# Patient Record
Sex: MALE | Race: BLACK OR AFRICAN AMERICAN | URBAN - METROPOLITAN AREA
[De-identification: names, ages, dates, MRNs, and addresses within clinical notes are randomized per-mention and may not be internally consistent; named-entity substitution may affect disease eponyms.]

---

## 2019-01-01 ENCOUNTER — HOSPITAL ENCOUNTER (OUTPATIENT)
Facility: CLINIC | Age: 64
End: 2019-05-05
Attending: EMERGENCY MEDICINE | Admitting: INTERNAL MEDICINE
Payer: COMMERCIAL

## 2019-01-01 ENCOUNTER — APPOINTMENT (OUTPATIENT)
Dept: GENERAL RADIOLOGY | Facility: CLINIC | Age: 64
End: 2019-01-01
Attending: EMERGENCY MEDICINE
Payer: COMMERCIAL

## 2019-01-01 VITALS
RESPIRATION RATE: 22 BRPM | OXYGEN SATURATION: 90 % | HEART RATE: 66 BPM | DIASTOLIC BLOOD PRESSURE: 45 MMHG | WEIGHT: 276 LBS | SYSTOLIC BLOOD PRESSURE: 76 MMHG

## 2019-01-01 DIAGNOSIS — I46.9 CARDIAC ARREST (H): ICD-10-CM

## 2019-01-01 LAB
ALBUMIN SERPL-MCNC: 2.6 G/DL (ref 3.4–5)
ALP SERPL-CCNC: 129 U/L (ref 40–150)
ALT SERPL W P-5'-P-CCNC: 84 U/L (ref 0–70)
ANION GAP SERPL CALCULATED.3IONS-SCNC: 35 MMOL/L (ref 3–14)
AST SERPL W P-5'-P-CCNC: 136 U/L (ref 0–45)
BASE DEFICIT BLDV-SCNC: 19.6 MMOL/L
BASOPHILS # BLD AUTO: 0.1 10E9/L (ref 0–0.2)
BASOPHILS NFR BLD AUTO: 0.5 %
BILIRUB SERPL-MCNC: 1.9 MG/DL (ref 0.2–1.3)
BUN SERPL-MCNC: 56 MG/DL (ref 7–30)
CALCIUM SERPL-MCNC: 10.5 MG/DL (ref 8.5–10.1)
CHLORIDE SERPL-SCNC: 91 MMOL/L (ref 94–109)
CO2 SERPL-SCNC: 9 MMOL/L (ref 20–32)
CREAT SERPL-MCNC: 3.48 MG/DL (ref 0.66–1.25)
DIFFERENTIAL METHOD BLD: ABNORMAL
DOHLE BOD BLD QL SMEAR: PRESENT
EOSINOPHIL # BLD AUTO: 0.1 10E9/L (ref 0–0.7)
EOSINOPHIL NFR BLD AUTO: 0.5 %
ERYTHROCYTE [DISTWIDTH] IN BLOOD BY AUTOMATED COUNT: 12.1 % (ref 10–15)
GFR SERPL CREATININE-BSD FRML MDRD: 18 ML/MIN/{1.73_M2}
GLUCOSE SERPL-MCNC: 462 MG/DL (ref 70–99)
HCO3 BLDV-SCNC: 16 MMOL/L (ref 21–28)
HCO3 BLDV-SCNC: 6 MMOL/L (ref 21–28)
HCT VFR BLD AUTO: 40.8 % (ref 40–53)
HCT VFR BLD CALC: 42 %PCV (ref 40–53)
HGB BLD CALC-MCNC: 14.3 G/DL (ref 13.3–17.7)
HGB BLD-MCNC: 13.6 G/DL (ref 13.3–17.7)
LYMPHOCYTES # BLD AUTO: 2.6 10E9/L (ref 0.8–5.3)
LYMPHOCYTES NFR BLD AUTO: 15.5 %
MCH RBC QN AUTO: 32.9 PG (ref 26.5–33)
MCHC RBC AUTO-ENTMCNC: 33.3 G/DL (ref 31.5–36.5)
MCV RBC AUTO: 99 FL (ref 78–100)
METAMYELOCYTES # BLD: 0.7 10E9/L
METAMYELOCYTES NFR BLD MANUAL: 4 %
MONOCYTES # BLD AUTO: 2.3 10E9/L (ref 0–1.3)
MONOCYTES NFR BLD AUTO: 13.5 %
NEUTROPHILS # BLD AUTO: 10.8 10E9/L (ref 1.6–8.3)
NEUTROPHILS NFR BLD AUTO: 64.5 %
NRBC # BLD AUTO: 1.2 10*3/UL
NRBC BLD AUTO-RTO: 7 /100
O2/TOTAL GAS SETTING VFR VENT: ABNORMAL %
OXYHGB MFR BLDV: 60 %
OXYHGB MFR BLDV: 64 %
PCO2 BLDV: 100 MM HG (ref 40–50)
PCO2 BLDV: 75 MM HG (ref 40–50)
PH BLDV: 6.8 PH (ref 7.32–7.43)
PH BLDV: ABNORMAL PH (ref 7.32–7.43)
PLATELET # BLD AUTO: ABNORMAL 10E9/L (ref 150–450)
PLATELET # BLD EST: ABNORMAL 10*3/UL
PO2 BLDV: 64 MM HG (ref 25–47)
PO2 BLDV: 72 MM HG (ref 25–47)
POLYCHROMASIA BLD QL SMEAR: SLIGHT
POTASSIUM BLD-SCNC: 5.7 MMOL/L (ref 3.4–5.3)
POTASSIUM SERPL-SCNC: 4.2 MMOL/L (ref 3.4–5.3)
PROMYELOCYTES # BLD MANUAL: 0.3 10E9/L
PROMYELOCYTES NFR BLD MANUAL: 1.5 %
PROT SERPL-MCNC: 8.7 G/DL (ref 6.8–8.8)
RBC # BLD AUTO: 4.13 10E12/L (ref 4.4–5.9)
SODIUM BLD-SCNC: 135 MMOL/L (ref 133–144)
SODIUM SERPL-SCNC: 135 MMOL/L (ref 133–144)
TROPONIN I SERPL-MCNC: 0.11 UG/L (ref 0–0.04)
WBC # BLD AUTO: 16.7 10E9/L (ref 4–11)

## 2019-01-01 PROCEDURE — 25000125 ZZHC RX 250

## 2019-01-01 PROCEDURE — 80053 COMPREHEN METABOLIC PANEL: CPT | Performed by: EMERGENCY MEDICINE

## 2019-01-01 PROCEDURE — 25000125 ZZHC RX 250: Performed by: EMERGENCY MEDICINE

## 2019-01-01 PROCEDURE — 96365 THER/PROPH/DIAG IV INF INIT: CPT | Mod: 59

## 2019-01-01 PROCEDURE — 40000501 ZZHCL STATISTIC HEMATOCRIT ED POCT

## 2019-01-01 PROCEDURE — 27210131 ZZH KIT ART LINE INSERTION

## 2019-01-01 PROCEDURE — 96368 THER/DIAG CONCURRENT INF: CPT

## 2019-01-01 PROCEDURE — 40000498 ZZHCL STATISTIC POTASSIUM ED POCT

## 2019-01-01 PROCEDURE — 40000008 ZZH STATISTIC AIRWAY CARE

## 2019-01-01 PROCEDURE — 92950 HEART/LUNG RESUSCITATION CPR: CPT

## 2019-01-01 PROCEDURE — 99291 CRITICAL CARE FIRST HOUR: CPT | Mod: 25

## 2019-01-01 PROCEDURE — 25000128 H RX IP 250 OP 636: Performed by: EMERGENCY MEDICINE

## 2019-01-01 PROCEDURE — 93005 ELECTROCARDIOGRAM TRACING: CPT

## 2019-01-01 PROCEDURE — 82805 BLOOD GASES W/O2 SATURATION: CPT | Performed by: EMERGENCY MEDICINE

## 2019-01-01 PROCEDURE — 25800030 ZZH RX IP 258 OP 636: Performed by: EMERGENCY MEDICINE

## 2019-01-01 PROCEDURE — 82803 BLOOD GASES ANY COMBINATION: CPT | Performed by: INTERNAL MEDICINE

## 2019-01-01 PROCEDURE — 85025 COMPLETE CBC W/AUTO DIFF WBC: CPT | Performed by: EMERGENCY MEDICINE

## 2019-01-01 PROCEDURE — 25000128 H RX IP 250 OP 636

## 2019-01-01 PROCEDURE — 83605 ASSAY OF LACTIC ACID: CPT | Performed by: INTERNAL MEDICINE

## 2019-01-01 PROCEDURE — 40000275 ZZH STATISTIC RCP TIME EA 10 MIN

## 2019-01-01 PROCEDURE — 84484 ASSAY OF TROPONIN QUANT: CPT | Performed by: EMERGENCY MEDICINE

## 2019-01-01 PROCEDURE — 31500 INSERT EMERGENCY AIRWAY: CPT

## 2019-01-01 PROCEDURE — 40000497 ZZHCL STATISTIC SODIUM ED POCT

## 2019-01-01 PROCEDURE — 92950 HEART/LUNG RESUSCITATION CPR: CPT | Performed by: INTERNAL MEDICINE

## 2019-01-01 PROCEDURE — 51702 INSERT TEMP BLADDER CATH: CPT

## 2019-01-01 PROCEDURE — 40000986 XR CHEST PORT 1 VW

## 2019-01-01 PROCEDURE — 25800030 ZZH RX IP 258 OP 636

## 2019-01-01 PROCEDURE — 94002 VENT MGMT INPAT INIT DAY: CPT

## 2019-01-01 PROCEDURE — 99292 CRITICAL CARE ADDL 30 MIN: CPT

## 2019-01-01 PROCEDURE — 36556 INSERT NON-TUNNEL CV CATH: CPT

## 2019-01-01 PROCEDURE — 96375 TX/PRO/DX INJ NEW DRUG ADDON: CPT

## 2019-01-01 PROCEDURE — 36620 INSERTION CATHETER ARTERY: CPT

## 2019-01-01 PROCEDURE — 40000256 ZZH STATISTIC CARDIOPULM RESUSCITATION

## 2019-01-01 RX ORDER — NOREPINEPHRINE BITARTRATE/D5W 16MG/250ML
0.03-0.4 PLASTIC BAG, INJECTION (ML) INTRAVENOUS CONTINUOUS
Status: DISCONTINUED | OUTPATIENT
Start: 2019-01-01 | End: 2019-01-01

## 2019-01-01 RX ORDER — PIPERACILLIN SODIUM, TAZOBACTAM SODIUM 3; .375 G/15ML; G/15ML
3.38 INJECTION, POWDER, LYOPHILIZED, FOR SOLUTION INTRAVENOUS ONCE
Status: COMPLETED | OUTPATIENT
Start: 2019-01-01 | End: 2019-01-01

## 2019-01-01 RX ORDER — NOREPINEPHRINE BITARTRATE/D5W 16MG/250ML
0.03-0.4 PLASTIC BAG, INJECTION (ML) INTRAVENOUS CONTINUOUS
Status: DISCONTINUED | OUTPATIENT
Start: 2019-01-01 | End: 2019-05-05 | Stop reason: HOSPADM

## 2019-01-01 RX ADMIN — PIPERACILLIN SODIUM,TAZOBACTAM SODIUM 3.38 G: 3; .375 INJECTION, POWDER, FOR SOLUTION INTRAVENOUS at 21:30

## 2019-01-01 RX ADMIN — NOREPINEPHRINE BITARTRATE 0.2 MCG/KG/MIN: 1 INJECTION INTRAVENOUS at 20:40

## 2019-01-01 RX ADMIN — VASOPRESSIN 2.4 UNITS/HR: 20 INJECTION INTRAVENOUS at 21:31

## 2019-01-01 RX ADMIN — SODIUM BICARBONATE: 84 INJECTION, SOLUTION INTRAVENOUS at 21:43

## 2019-05-05 NOTE — ED NOTES
Bed: [facility]  Expected date:   Expected time:   Means of arrival:   Comments:  Cardiac arrest

## 2019-05-05 NOTE — PROGRESS NOTES
Pt arrived to ICU via flying squad. Arresting (PEA)  in transport. Pt on cart w/ Tigre in place. Spouse Brandi at bedside throughout. Code per Doctor Interiano's direction. Pt's two daughters arrived and family in conversation with MD made the decision to withhold CPR.     Around midnight more family members at bedside. Empathy and comfort measures provided to family members. Family discussing autopsy.      Nova Garber RN

## 2019-05-05 NOTE — CONSULTS
"Critical Care Medicine consult  Date of Service: 5/4/2019    Reason for admission: cardiac arrest    HPI: This is a 64 year old male with a history of HTN who was brought in by ems after he was found unresponsive. Apparently, pt had been feeling weak and \"sick\" or the past week. He was found at home unresponsive at the bottom of stairs. Upon EMS arrival, pt was started on CPR as he was found in PEA arrest. He received about 5-6 Epi en route. They placed a Ollie airway.  In the ED, pt's airway was changed and he was intubated. CPR was continued for about an hour in the ED as well. He was given numerous doses of epi. He was also started on dopamine, vasopressin, levophed, epinephrine, and bicarb drip. He then had a pulse and pt was sent to ICU. Pt lost his pulse while in the elevator and was in PEA arrest. CPR was continued for about 40 minutes in the ICU.. I had multiple discussions with wife about prognosis. We then called daughters who were on their way. Soon after daughters arrived, I explained to family prognosis and shared decision was made of withholding further CPR.    PMH:  HTN    Allergies not on file  No medications prior to admission.     Social History     Socioeconomic History     Marital status:      Spouse name: Not on file     Number of children: Not on file     Years of education: Not on file     Highest education level: Not on file   Occupational History     Not on file   Social Needs     Financial resource strain: Not on file     Food insecurity:     Worry: Not on file     Inability: Not on file     Transportation needs:     Medical: Not on file     Non-medical: Not on file   Tobacco Use     Smoking status: Not on file   Substance and Sexual Activity     Alcohol use: Not on file     Drug use: Not on file     Sexual activity: Not on file   Lifestyle     Physical activity:     Days per week: Not on file     Minutes per session: Not on file     Stress: Not on file   Relationships     Social " connections:     Talks on phone: Not on file     Gets together: Not on file     Attends [employer] service: Not on file     Active member of club or organization: Not on file     Attends meetings of clubs or organizations: Not on file     Relationship status: Not on file     Intimate partner violence:     Fear of current or ex partner: Not on file     Emotionally abused: Not on file     Physically abused: Not on file     Forced sexual activity: Not on file   Other Topics Concern     Not on file   Social History Narrative     Not on file     No family history on file.  Review of Systems  Unable to be obtained due to critical illness and altered mental status        Vitals  BP  Min: 66/41  Max: 101/23  Pulse  Min: 48  Max: 186  Resp  Min: 17  Max: 22  SpO2  Min: 90 %  Max: 90 %  No intake or output data in the 24 hours ending 05/04/19 2333  Arterial Line BP: (46-89)/(22-38) 89/38  MAP:  [34 mmHg-48 mmHg] 48 mmHg  BP - Mean:  [46-57] 56  Ventilation Mode: CMV/AC  (Continuous Mandatory Ventilation/ Assist Control)  Rate Set (breaths/minute): 24 breaths/min  Tidal Volume Set (mL): 480 mL  PEEP (cm H2O): 12 cmH2O  Oxygen Concentration (%): 100 %  Resp: 22      Physical Exam:  Gen: fixed and dilated pupils  HEENT: orally intubated  Pulm: absent bs  CV: absent heart sounds  Abd: distended  Ext: absent pulses  Neuro: unresponsive    Diagnostic Data: Reviewed independently by me.  Recent Labs   Lab 05/04/19 2009 05/04/19 2005   WBC  --  16.7*   HGB 14.3 13.6   HCT  --  40.8   PLT  --  Platelets clumped, platelet count unavailable     Recent Labs   Lab 05/04/19 2009 05/04/19 2000    135   CO2  --  9*   BUN  --  56*   ALKPHOS  --  129   ALT  --  84*   AST  --  136*   Invalid input(s): MGNo results found for: PHOS   No results for input(s): INR, PTT in the last 168 hours.    Invalid input(s): APTT  No components found for: HGBA1C  No results for input(s): CKTOTAL in the last 168 hours.    Invalid input(s): TROPONINI,  TROPONINT, CKMBINDEX    Imaging:     Chest  Xr, 1 View Portable    Result Date: 5/4/2019  CHEST PORTABLE ONE VIEW   5/4/2019 9:06 PM HISTORY: Post intubation. COMPARISON: None.     IMPRESSION: Portable view of the chest is performed. Right lower lobe lung infiltrate and airspace consolidation is noted concerning for right lower lobe pneumonia. Questionable retrocardiac infiltrate in the left lower lobe as well. Heart size is mildly prominent. No pneumothorax or significant left pleural effusion. Blunting right lateral costophrenic angle could indicate a small right pleural effusion. Endotracheal tube is present terminating approximately 3 cm above the lance. ROSALIND RIVERS MD       ASSESSMENT:   Cardiorespiratory arrest  PEA arrest  Unresponsive for unknown duration  Pneumonia on CXR    PLAN/RECOMMENDATIONS:  CPR about 30 minutes on route, CPR was continued for about an hour in the ED as well before ROSC. He was given numerous doses of epi. He was also started on dopamine, vasopressin, levophed, epinephrine, and bicarb drip.  While being transported to ICU, pt lost his pulse while in the elevator and was in PEA arrest. CPR was continued for about 40 minutes in the ICU.. I had multiple discussions with wife about prognosis. We then called daughters who were on their way. Soon after daughters arrived, I explained to family prognosis and shared decision was made of withholding further CPR.    Pt was pronounced at 22:36.    CC time > 40 min excluding procedures.:    Jenny Interiano MD  Critical Care Medicine

## 2019-05-05 NOTE — ED PROVIDER NOTES
History   Chief Complaint:  Cardiac arrest    HPI   Geo Espinosa is a 64 year old male, with a history of diabetes, who presents to the ED via for evaluation of cardiac arrest. Upon arrival EMS states the patient has been feeling short of breath and weak the last couple days, with a decreased appetite. The patient was found at the bottom of the stairs unresponsive for an unknown amount of time around 1906. EMS states he has been in PVA for 45 min, 5-6 epis were given en route, last one at 1946, glucose was 559 with no known diabetes. IO 18 gauge placed in left tibfib Wife is known to be in transit to the ED.     Allergies:  Lisinopril    Medications:    Sildenafil  Cozaar  Norvasc  Atenolol  Hygroton  Flexeril  Norco    Past Medical History:    Hypertension  Hypogonadism  diabetes  Past Surgical History:    History reviewed. No pertinent surgical history.    Family History:    Diabetes  Hypertension  Cancer    Social History:  Smoking status: Never  Alcohol use: Yes  Marital Status:   [2]    Review of Systems   Unable to perform ROS: Patient unresponsive     Physical Exam     Patient Vitals for the past 24 hrs:   BP Pulse Heart Rate Resp SpO2 Weight   05/04/19 2105 (!) 76/45 66 54 22 -- --   05/04/19 2100 (!) 66/41 78 (!) 30 17 90 % --   05/04/19 2040 -- -- -- -- -- 125.2 kg (276 lb)   05/04/19 2015 (!) 101/23 (!) 48 55 -- -- --   05/04/19 2000 98/47 186 194 -- -- --     Physical Exam  Constitutional: Black male supine. Ollie airway in place with ongoing bagging. KIMBERLY device on chest with ongoing CPR.  HENT: No signs of trauma.   Eyes: Pupils are equal, 5mm round, and non reactive.   Neck: Normal range of motion. No JVD present. No cervical adenopathy.  Cardiovascular: No heart sounds. No peripheral pulses.  Pulmonary/Chest: Bilateral breath sounds with bagging.  Abdominal: Soft. No masses.  Musculoskeletal: No edema.   Lymphadenopathy: No lymphadenopathy.   Neurological: unresponsive, no spontaneous  movements. NO facial asymmetry. No response to pain. GCS 3.  Skin: Skin is warm and dry. No rash noted. No erythema.     Emergency Department Course   ECG (21:33:38):  Rate 42 bpm. IN interval *. QRS duration 150. QT/QTc 538/449. P-R-T axes * 20 239. Ventricular escape. Interpreted at 2133 by Stas Puentes MD.    Imaging:  Radiographic findings were communicated with the Primary MD who voiced understanding of the findings.    XR Chest, portable, 1 view:  Portable view of the chest is performed. Right lower lobe  lung infiltrate and airspace consolidation is noted concerning for  right lower lobe pneumonia. Questionable retrocardiac infiltrate in  the left lower lobe as well. Heart size is mildly prominent. No  pneumothorax or significant left pleural effusion. Blunting right  lateral costophrenic angle could indicate a small right pleural  effusion. Endotracheal tube is present terminating approximately 3 cm  above the lance.    Imaging independently reviewed and agree with radiologist interpretation.      Laboratory:  ISTAT Electrolytes POCT: , K 5.7, HGB 14.3, Hematocrit 42    CBC: WBC 16.7, HGB 13.6, PLT platelets clumped, platelet count unavailable    CMP: CL 91, CO2 9, Anion Gap 35, Glucose 462, Creatinine 3.48, GFR 21, Ca 10.5, Bilirubin Total 11.9, Albumin 2.6, ALT 84,     Troponin: 0.111    ISTAT blood gas and oxyhgb: pH:?, PCO2: 75, PO2: 72, Bicarbonate: 6, Oxyhgb: 60    ISTAT blood gas and oxyhgb: pH: 6.80, PCO2: 100, PO2: 64, Bicarbonate: 16, Oxyhgb: 64    Procedures:  Oral Endotracheal Intubation  I removed the Ollie airway and under glide scope observation, placed an 8.0 endotracheal tube. Good breath sounds were heard with this and nelcor was positive.     Central Line  Initial attempt at right subclavian was made, but the wire kinked and could not pass the catheter. I then went to the groin and placed a right femoral CVP using seldinger technique with ultrasound guidance. This  went without a problem.   Arterial Line  Right femoral arterial line was placed, again using seldinger technique under ultrasound guidance.     Interventions:  2040: Levophed 16 mg in 250 mL infusion  2105: Adrenalin 0.3 mg IV  Multiple doses given  2152: Zosyn 3.375 g IV   2152: Sodium bicarbonate 150 mEq in water infusion IV  2153: Vasopressin 40 units in D5W 40 mL infusion    Emergency Department Course:  The patient arrived in the emergency department via EMS.  Past medical records, nursing notes, and vitals reviewed.  1950: I performed an exam of the patient and obtained history, as documented above.  IV inserted and blood drawn.    1955: Epi given, 18 gauge IV placed in left arm.    1955: Patient was moved to hospital monitor.    1955: Pulse check, paused KIMBERLY    1956: KIMBERLY resumed.    1958: EMS airway removed, intubation procedure performed per the above note.     2000: KIMBERLY paused, US imaging utilized for pulse check.     2001: KIMBERLY resumed.    2002: KIMBERLY paused, US imaging utilized for pulse check.    2002: KIMBERLY resumed.    2004: 2 AMPS bicarb administered.    2004: Wife arrived at ED and I went out to talk to her.    2008: I brought the wife into the stabilization room.    2008: KIMBERLY paused, US imaging utilized for check pulse.    2008: KIMBERLY resumed.    2010: One dose of epi given.    2011: KIMBERLY paused,pulse check.    2011: Pulse found.    2012: Pulse stopped, KIMBERLY resumed    2015: KIMBERLY paused, pulse check    2016 KIMBERLY resumed    2016: I prepped for central line placement.    2019:One dose of epi administered.    2022 KIMBERLY pause, pulse found.    2024: Pulse stopped, KIMBERLY resumed.    2024: Unable to insert central line.    2024: Groin central line procedure prepped. Read above procedure note.    2034: Groin central line completed.    2036: KIMBERLY paused, pulse check    2036: Pulse found, US imaging utilization.    2038: One amp bicarb administered.    2040: One dose of Levophed  administered.    2041: Arterial line procedure began. See procedure note above.    2044: Pulse check, KIMBERLY resumed.    2046: KIMBERLY paused, pulse check.    2047: Two amps bicarb administered.    2048: KIMBERLY resumed.    2057: KIMBERLY paused, US imaging utilized for pulse check.    2057: Pulse found.    2057: Levophed administered    2058: Two amps of bicarb administered.    2058: KIMBERLY resumed.    2059: KIMBERLY paused for portable chest x-ray and pulse was found.    2102: I read the portable x-ray results.    2107: Respirator stopped. Patient respiring on his own.    2111: I spoke to Dr. Interiano of the hospitalist service who accepts the patient for admission.     Findings and plan explained to the spouse     2111: Discussed the patient with Dr. Interiano, who will admit the patient to an ICU bed for further monitoring, evaluation, and treatment.     Impression & Plan    Medical Decision Making:  Geo Espinosa is a 64 year old male who presents to the emergency department, by ambulance, as a cardiac arrest. Patient was brought from his home. After he had passed out, his wife found him near to bottom of the stairs, although she didn't think she had fallen down them. There was no sign of immediate trauma. Patient had not been feeling well for a couple days. He was reported to be on medications for hypertension, but after reviewing his chart he was on metformin for diabetes and renal insufficieny. He was told to stop his nonsteroidals on 4/29. Patient is a non-smoker. Paramedics found the patient in PVA with no heart rate. Ollie airway was placed after an attempted endotracheal intubation and IO was placed in his left leg. He was given several rounds of epinephrine though this by the time he arrived here. He had been in PVA for over 45 minutes. Also, his blood sugar was found to be over 500., On initial exam, patient is non responsive, his pupils are not dilated, but are fixed. There was respirations with bagging and the KIMBERLY  device was in place. We continued epinephrine while we placed the patient on our monitors and started peripheral IVs. When the KIMBERLY was stopped the patient was in asystolic. Cardiac ultrasound showed cardiac standstill with no pericardial effusion. KIMBERLY was turned back on and a definitive airway was placed. Epinephrine was given and IV fluids were given. KIMBERLY was on. Patient again flat lined with no cardiac activity. Given his age, we continued resuscitation efforts. I brought the wife in to be at bed side and told her the dire conditions her  was in. While the wife was at bedside, we again turned off the kimberly, there was a pulse although faint. On ultrasound there was a squeeze. Resuscitation efforts persisted. Patient was on dopamine, while additional central line and arterial line were placed. Patient was switched to levophed. Initial blood began to returned and was noted to be very acidotic and received 2 amps of bicarb. Further labs came back, potassium labs were found to be elevated and received an amp of calcium. Patient resuscitation efforts continued, at times there was a pulse, was lost at times, but we were able to get it back. Eventually, IV epinephrine drip and IV bicarb drip were hung, in addition vasopressin was started. Further labs came back and noted the patient was in renal failure with a creatinine of 3.48 and a slightly elevated troponin. A chest x-ray which showed a right lower lobe pneumonia and Zosyn was added. Patient was stabilized on multiple pressors, bicarb drip, and maintained a blood pressure in the 80s and pulse in the 60s. I contacted the intensivist and we were able to transfer the patient upstairs. I did spend time with the wife explaining the dire condition her  was in and urged her to contact family.    Critical Care time:  65 minutes  excluding procedures.    Diagnosis:    ICD-10-CM    1. Cardiac arrest (H) I46.9 CBC with platelets differential     Blood gas  venous and oxyhgb   2. Diabetes  3. Renal Failure  4.  Pneumonia  5.  Severe metabolic acidosis    Disposition:  Admitted to  ICU    Rylan Duncan  5/4/2019    EMERGENCY DEPARTMENT  Scribe Disclosure:  I, Rylan Duncan, am serving as a scribe at 7:50 PM on 5/4/2019 to document services personally performed by Stas Puentes MD based on my observations and the provider's statements to me.         tSas Puentes MD  05/05/19 0037

## 2019-05-05 NOTE — PROGRESS NOTES
Assisted ED physician with intubation. Patient intubated with 8.0 ETT secured 24 cm at the lip    Bagged patient during CPR. Once ROSC obtained and maintained placed patient on ventilator with following settings:    Ventilation Mode: CMV/AC  (Continuous Mandatory Ventilation/ Assist Control)  Rate Set (breaths/minute): 24 breaths/min  Tidal Volume Set (mL): 480 mL  PEEP (cm H2O): 12 cmH2O  Oxygen Concentration (%): 100 %  Resp: 22    Joanne Pulido, RT

## 2019-05-06 NOTE — PROGRESS NOTES
Met with family to complete autopsy request paperwork.  They are agreeable with transfer to the  for autopsy and have signed the request form which I have signed as well. They do not want his tissues or organs used for research purposes and we have indicated this clearly on the form.

## 2019-05-08 ENCOUNTER — RESULTS ONLY (OUTPATIENT)
Dept: LAB | Age: 64
End: 2019-05-08

## 2019-05-09 NOTE — PROGRESS NOTES
Preliminary results from autopsy discussed with Dr Story earlier in day. Called Mrs Lugo to inform of results - overwhelming infection/pneumonia as likely precipitant of patient decompensation and demise. She mentioned having already discussed case with pathology.    She did request efforts to expedite death certificate. I advised to call  home to contact us and we can work to get that completed.     Devyn Martin

## 2019-05-10 LAB
BACTERIA SPEC CULT: ABNORMAL
Lab: ABNORMAL
SPECIMEN SOURCE: ABNORMAL

## 2019-05-11 LAB
BACTERIA SPEC CULT: ABNORMAL
Lab: ABNORMAL
SPECIMEN SOURCE: ABNORMAL

## 2019-06-05 LAB
FUNGUS SPEC CULT: NORMAL
Lab: NORMAL
SPECIMEN SOURCE: NORMAL

## 2019-06-07 LAB
CO2 BLDCOV-SCNC: 9 MMOL/L (ref 21–28)
LACTATE BLD-SCNC: >20 MMOL/L (ref 0.7–2.1)
PCO2 BLDV: 71 MM HG (ref 40–50)
PH BLDV: 6.73 PH (ref 7.32–7.43)
PO2 BLDV: 23 MM HG (ref 25–47)
SAO2 % BLDV FROM PO2: 12 %

## 2019-06-14 LAB — COPATH REPORT: NORMAL
